# Patient Record
Sex: MALE | Race: WHITE
[De-identification: names, ages, dates, MRNs, and addresses within clinical notes are randomized per-mention and may not be internally consistent; named-entity substitution may affect disease eponyms.]

---

## 2020-06-16 ENCOUNTER — HOSPITAL ENCOUNTER (OUTPATIENT)
Dept: HOSPITAL 46 - OPS | Age: 40
Discharge: HOME | End: 2020-06-16
Attending: OTOLARYNGOLOGY
Payer: COMMERCIAL

## 2020-06-16 VITALS — BODY MASS INDEX: 28.93 KG/M2 | WEIGHT: 180.01 LBS | HEIGHT: 66 IN

## 2020-06-16 DIAGNOSIS — S02.2XXA: Primary | ICD-10-CM

## 2020-06-16 DIAGNOSIS — K21.9: ICD-10-CM

## 2020-06-16 DIAGNOSIS — Z79.899: ICD-10-CM
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
## 2020-06-16 PROCEDURE — 09BM0ZZ EXCISION OF NASAL SEPTUM, OPEN APPROACH: ICD-10-PCS | Performed by: OTOLARYNGOLOGY

## 2020-06-16 NOTE — NUR
06/16/20 1007 Ciarra Galan
1003 PATIENT ARRIVES TO PACU UNRESPONSIVE TO PAIN. ORAL AIRWAY IN
PLACE. RESP EVEN AND UNLABORED, MASK AT 6 LITERS. OFFICERS X2 AT
BEDSIDE.

## 2020-06-16 NOTE — NUR
PT IS BACK TO DS FROM PACU. HE IS BACK TO HIS BASELINE. HE IS REPORTING PAIN
4/10, HE WOULD LIKE SOMETHING FOR TH PAIN BEFORE IT GETS WORSE. HE IS
TOLERTING WATER AND CRACKERS. CALL LIGHT WITHIN REACH. [facility]SPORT OFFICERS
AT BEDSIDE. NO ADDITIONAL NEEDS AT THIS TIME.

## 2020-06-16 NOTE — OR
[facility]
                                    [address], [place]  [address]
_________________________________________________________________________________________
                                                                 Draft    
 
 
DATE OF OPERATION:
06/16/2020
 
SURGEON:
Adolfo Hurt MD
 
PREOPERATIVE DIAGNOSIS:
Nasal septal fracture.
 
POSTOPERATIVE DIAGNOSIS:
Nasal septal fracture.
 
PROCEDURES:
Septoplasty, closed reduction of nasal fracture.
 
ANESTHESIA:
General LMA; Navneet ZAFAR.
 
PREOPERATIVE HISTORY:
Mr. Bernard is a 40-year-old inmate, who suffered a nasal septal fracture quite severe
about 3 weeks ago.  He is being taken to the operating room for the above-mentioned
procedures. 
 
PROCEDURE AND FINDINGS:
After informed consent, the patient was taken to the operating room, placed in supine
position, where general LMA anesthesia was induced.  The patient and procedure were
verified.  The patient received preoperative intranasal oxymetazoline intravenous Ancef.
 The preop CT was viewed throughout.  The external exam showed an obvious nasal bone
fracture with nasal dorsum deviated to the right side, severely digital pressure on the
right nasal bone, reduced that bone and elevator under the left nasal bone reduced that
bone also and the dorsum was midline after the procedure. 
 
The septum was severely fractured, deviated mainly to the right side appeared to have a
spur on the left, which was old.  The cartilage was exposed on the spur on the left
inferiorly and this was all result deformed, deviated obstructive cartilage, it was
excised with the Bridger.  The septum was medialized with a speculum, the airway was
improved.  Septum midline.  Minimal bleeding stopped afterwards.  Packing was placed.  A
Onofre pack coated with Neosporin on the left under the left nasal bone supporting it
into its normal anatomic position.  Trimmed Merocel equal amount each side, coated with
Neosporin, placed, and tied anteriorly over a pad.  The pharynx was suctioned clear of
blood and secretions.  The patient was then awakened, extubated, and transported to
recovery room in good condition.  No complications. 
 
                                                                                    
_________________________________________________________________________________________
PATIENT NAME:     SANDRA BERNARD                        
MEDICAL RECORD #: [id number]            OPERATIVE REPORT              
          ACCT #: [id number]  
DATE OF BIRTH:   [birth year]            REPORT #: [id number]      
PHYSICIAN:        ADOLFO HURT MD              
PCP:              MONALISA AYALA MD              
REPORT IS CONFIDENTIAL AND NOT TO BE RELEASED WITHOUT AUTHORIZATION
 
 
                                  [facility]
                                    [address] [place] [address]  [id number]
_________________________________________________________________________________________
                                                                 Draft    
 
 
 
BLOOD LOSS:
Minimal.
 
SPECIMEN:
No specimen.
 
DRAINS:
No drains.
 
PACKING:
Two pieces of Merocel on left and one on the right.
 
 
 
            ________________________________________
            Adolfo Hurt MD 
 
 
GC/MODL
Job #:  [id number]
DD:  06/16/2020 10:09:33
DT:  06/16/2020 14:08:02
 
 
Copies:                                
~
 
 
 
 
 
 
 
 
 
 
 
 
 
 
 
 
                                                                                    
_________________________________________________________________________________________
PATIENT NAME:     SANDRA BERNARD                        
MEDICAL RECORD #: [id number]            OPERATIVE REPORT              
          ACCT #: [id number]  
DATE OF BIRTH:   [birth year]            REPORT #: [id number]      
PHYSICIAN:        ADOLFO HURT MD              
PCP:              MONALISA AYAAL MD              
REPORT IS CONFIDENTIAL AND NOT TO BE RELEASED WITHOUT AUTHORIZATION

## 2020-06-16 NOTE — NUR
LE 1145: DISCHARGE INSTRUCTIONS GIVEN AND PATIENT VERBALIZES UNDERSTANDING.
CALL REPORT GIVEN TO LUIS SANTILLAN RN AND HER QUESTIONS ARE ANSWERED.
PATIENT IS DRESSING AND TRANSFERS HIMSELF TO THE WHEELCHAIR AND TOLERATES THAT
WELL.

## 2020-06-16 NOTE — NUR
LE 1105: PATIENT ARRIVED TO DEPARTMENT AMBULATORY. PLACED IN ROOM 5 FOR
ROUTINE DRESSING CHANGE. PATIENT DENIES ISSUES SINCE LAST DRESSING CHANGE.
PATIENT REPORTS NO ISSUES WITH CHANGING THE DRESSING AT HOME. DRESSING CHANGE
IS DONE PER ORDER AND PATIENT TOLERATES THAT WELL. BASE OF WOUND IS NO
GRANULATION TISSUE WITH A SMALL PALE/CREAM AREA NOTED AT 6 O'CLOCK. PATIENT IS
DISCHARGED HOME AMBULATORY TO RETURN TOMORROW FOR NEXT DRESSING CHANGE.